# Patient Record
Sex: MALE | Race: WHITE | NOT HISPANIC OR LATINO
[De-identification: names, ages, dates, MRNs, and addresses within clinical notes are randomized per-mention and may not be internally consistent; named-entity substitution may affect disease eponyms.]

---

## 2023-02-22 PROBLEM — Z00.00 ENCOUNTER FOR PREVENTIVE HEALTH EXAMINATION: Status: ACTIVE | Noted: 2023-02-22

## 2023-03-08 ENCOUNTER — APPOINTMENT (OUTPATIENT)
Dept: GASTROENTEROLOGY | Facility: CLINIC | Age: 83
End: 2023-03-08
Payer: MEDICARE

## 2023-03-08 VITALS
SYSTOLIC BLOOD PRESSURE: 140 MMHG | BODY MASS INDEX: 31.98 KG/M2 | OXYGEN SATURATION: 97 % | DIASTOLIC BLOOD PRESSURE: 82 MMHG | HEART RATE: 71 BPM | WEIGHT: 211 LBS | HEIGHT: 68 IN

## 2023-03-08 DIAGNOSIS — K56.41 FECAL IMPACTION: ICD-10-CM

## 2023-03-08 DIAGNOSIS — K62.5 HEMORRHAGE OF ANUS AND RECTUM: ICD-10-CM

## 2023-03-08 DIAGNOSIS — Z80.0 FAMILY HISTORY OF MALIGNANT NEOPLASM OF DIGESTIVE ORGANS: ICD-10-CM

## 2023-03-08 PROCEDURE — 99214 OFFICE O/P EST MOD 30 MIN: CPT

## 2023-03-08 RX ORDER — SODIUM PICOSULFATE, MAGNESIUM OXIDE, AND ANHYDROUS CITRIC ACID 10; 3.5; 12 MG/160ML; G/160ML; G/160ML
10-3.5-12 MG-GM LIQUID ORAL
Qty: 2 | Refills: 1 | Status: ACTIVE | COMMUNITY
Start: 2023-03-08 | End: 1900-01-01

## 2023-03-08 NOTE — ASSESSMENT
[FreeTextEntry1] : 1.  rectal bleed with a forced bm\par \par plan;\par I am setting up a colonoscopy because of the family history and the recent symptoms\par Also, I have asked the patient to go on a high-fiber diet and I have discussed this with the patient and his wife.\par We will use a Clenpiq prep especially with his history of constipation and increasing constipation to the point of a fecal impaction\par Avoid Colace just stay on the high-fiber diet indefinitely increase the water intake

## 2023-03-08 NOTE — HISTORY OF PRESENT ILLNESS
[FreeTextEntry1] : This is a basically healthy gentleman, now 82 years old, who recently went to the emergency room with a history of straining for a heart virtually impacted large stool, which ultimately after considerable straining and vasovagal symptoms was able to evacuate, but he had fairly profuse bright red blood per rectum after that.  It was followed by some days of soft stools, and loose stools even, but over the last 5 or 7 days, taking Colace 1 a day, or 2 a day, his stools have been somewhat soft but normal, and over the last 5 to 7 days there has been no bleeding.  He has not had previous bleeding.\par \par He had a colonoscopy in 2018.\par His mother has had colon cancer and his father gastric cancer\par \par His mother was in her 60s\par \par he has some prostatic symptoms, and hx of carcinoma of prostate gland\par \par he has no coronary symptoms\par laft pleural effusion was tapped twice but seems to have decreased or resolved, so plans to consider another tap were put on hold\par \par no pulmonary symptoms such as cough or sob

## 2023-03-08 NOTE — PHYSICAL EXAM
[Healthy Appearing] : healthy appearing [Normal] : normal bowel sounds, non-tender, no masses, soft, no no hepato-splenomegaly [Normal Sphincter Tone] : normal sphincter tone [No External Hemorrhoid] : no external hemorrhoids [No Rectal Mass] : no rectal mass [de-identified] : looks really well can accumulate all the pillows in one room I might need what to make like it I [de-identified] : some soft heme negative brown stool

## 2023-03-26 ENCOUNTER — RESULT REVIEW (OUTPATIENT)
Age: 83
End: 2023-03-26

## 2023-03-27 ENCOUNTER — TRANSCRIPTION ENCOUNTER (OUTPATIENT)
Age: 83
End: 2023-03-27

## 2023-03-27 ENCOUNTER — APPOINTMENT (OUTPATIENT)
Dept: GASTROENTEROLOGY | Facility: HOSPITAL | Age: 83
End: 2023-03-27

## 2023-07-31 ENCOUNTER — TRANSCRIPTION ENCOUNTER (OUTPATIENT)
Age: 83
End: 2023-07-31